# Patient Record
Sex: FEMALE | Race: WHITE | NOT HISPANIC OR LATINO | Employment: FULL TIME | URBAN - METROPOLITAN AREA
[De-identification: names, ages, dates, MRNs, and addresses within clinical notes are randomized per-mention and may not be internally consistent; named-entity substitution may affect disease eponyms.]

---

## 2017-11-22 ENCOUNTER — LAB REQUISITION (OUTPATIENT)
Dept: LAB | Facility: HOSPITAL | Age: 42
End: 2017-11-22
Payer: COMMERCIAL

## 2017-11-22 ENCOUNTER — ALLSCRIPTS OFFICE VISIT (OUTPATIENT)
Dept: OTHER | Facility: OTHER | Age: 42
End: 2017-11-22

## 2017-11-22 DIAGNOSIS — Z01.419 ENCOUNTER FOR GYNECOLOGICAL EXAMINATION WITHOUT ABNORMAL FINDING: ICD-10-CM

## 2017-11-22 DIAGNOSIS — Z12.31 ENCOUNTER FOR SCREENING MAMMOGRAM FOR MALIGNANT NEOPLASM OF BREAST: ICD-10-CM

## 2017-11-22 PROCEDURE — 87624 HPV HI-RISK TYP POOLED RSLT: CPT | Performed by: OBSTETRICS & GYNECOLOGY

## 2017-11-22 PROCEDURE — G0145 SCR C/V CYTO,THINLAYER,RESCR: HCPCS | Performed by: OBSTETRICS & GYNECOLOGY

## 2017-11-23 NOTE — PROGRESS NOTES
Assessment  1  Visit for routine gyn exam (V72 31) (Z01 419)  2  Encounter for screening mammogram for breast cancer (V76 12) (Z12 31)    Plan  Encounter for screening mammogram for breast cancer    · MAMMO SCREENING BILATERAL W 3D & CAD; Status:Hold For - Scheduling; Requestedfor:22Nov2017;   Perform: [facility]; [phone]; Ordered; For:Encounter for screening mammogram for breast cancer; Ordered By:Shivam Bar;  Visit for routine gyn exam    · Follow-up visit in 1 year Evaluation and Treatment  Follow-up  Status: Hold For -Scheduling  Requested for: [id number]  Ordered; For: Visit for routine gyn exam;  Ordered By: Valentina Ramirez  Performed:   Due: 85NVU1637    Discussion/Summary  Currently, she eats a healthy diet and has an adequate exercise regimen  the risks and benefits of cervical cancer screening were discussed HPV and Pap Co-testing Done Today Breast cancer screening: the risks and benefits of breast cancer screening were discussed, monthly self breast exam was advised and mammogram has been ordered  Advice and education were given regarding aerobic exercise, weight bearing exercise, calcium supplements, vitamin D supplements and contraception  Gynecologic examBCM- Mirena, replacement in the near futureSBE once a month CoTest '17 and '20Exercise 3 times a week- she does 4Calcium thousand milligrams with vitamin D- she does  The patient has the current Goals:1  Mirena replacement1  The patent has the current Barriers:1  None1    Patient is able to Self-Care1        1 Amended By: Valentina Ramirez; Dec 18 2017 7:01 PM EST    Chief Complaint  Pt due for yv states no problemsLast pap- 01/10/12 D-      History of Present Illness  HPI: Vishnu Pappas is here for an annual visit  She is without complaints  She feels great  Her Mirena is due for replacement  Review of Systems   Constitutional: recent 5 lb weight loss, but-- no fever,-- not feeling poorly,-- no chills-- and-- not feeling tired    ENT: no ear ache, no loss of hearing, no nosebleeds or nasal discharge, no sore throat or hoarseness  Cardiovascular: no complaints of slow or fast heart rate, no chest pain, no palpitations, no leg claudication or lower extremity edema  Respiratory: no complaints of shortness of breath, no wheezing, no dyspnea on exertion, no orthopnea or PND  Breasts: no complaints of breast pain, breast lump or nipple discharge  Gastrointestinal: no complaints of abdominal pain, no constipation, no nausea or diarrhea, no vomiting, no bloody stools  Genitourinary: Coitus is twice to 3 times a week without problems, but-- no complaints of dysuria, no incontinence, no pelvic pain, no dysmenorrhea, no vaginal discharge or abnormal vaginal bleeding  Musculoskeletal: no complaints of arthralgia, no myalgia, no joint swelling or stiffness, no limb pain or swelling  Integumentary: no complaints of skin rash or lesion, no itching or dry skin, no skin wounds  Neurological: no complaints of headache, no confusion, no numbness or tingling, no dizziness or fainting  Active Problems  1  Visit for routine gyn exam () (Z01 419)    Past Medical History   · 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)    The active problems and past medical history were reviewed and updated today     2 para 2: SAVD 10/08 41 2/7 F 9-4; 10/10 39 wks F 8-8 Myoma 6 cm by US UTI  Mirena  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 - CT with FOSTER 3/16      Surgical History   · History of Oral Surgery Tooth Extraction Gwinner Tooth    Family History  Mother    · No pertinent family history   MGM- Lung Ca d 64 MGF- CVA d 81 PGM- dysrhythmia PGF- d from MVA complications   Social History   · Acute alcohol use   · Currently sexually active   · Daily caffeine consumption   · Exercises 3 to 4 times per week (V49 89) (Z78 9)   · Never a smoker   · No drug use   · Two children   ,  '04  Petra 7, Montse 5   Current Meds  1  Daily Vitamin Oral Tablet; Therapy: (Recorded:42Glv1223) to Recorded  2  Mirena (52 MG) 20 MCG/24HR Intrauterine Intrauterine Device; Therapy: (Recorded:22Nov2017) to Recorded    Allergies  1  No Known Drug Allergies    Vitals   Recorded: 22Nov2017 02:14PM   Heart Rate 61   Systolic 844, RUE, Sitting   Diastolic 64, RUE, Sitting   Height 5 ft 7 in   Weight 157 lb 4 oz   BMI Calculated 24 63   BSA Calculated 1 83     Physical Exam   Constitutional  General appearance: No acute distress, well appearing and well nourished  Neck  Neck: Normal, supple, trachea midline, no masses  Thyroid: Normal, no thyromegaly  Pulmonary  Respiratory effort: No increased work of breathing or signs of respiratory distress  Auscultation of lungs: Clear to auscultation  Cardiovascular  Auscultation of heart: Normal rate and rhythm, normal S1 and S2, no murmurs  Peripheral vascular exam: Normal pulses Throughout  Genitourinary  External genitalia: Normal and no lesions appreciated  Vagina: Normal, no lesions or dryness appreciated  Urethra: Normal    Urethral meatus: Normal    Bladder: Normal, soft, non-tender and no prolapse or masses appreciated  Cervix: Normal, no palpable masses  -- Mild eversion, IUD string present  Uterus: Normal, non-tender, not enlarged, and no palpable masses  -- RV  Adnexa/parametria: Normal, non-tender and no fullness or masses appreciated  Anus, perineum, and rectum: Normal sphincter tone, no masses, and no prolapse  Chest  Breasts: Normal and no dimpling or skin changes noted  Abdomen  Abdomen: Normal, non-tender, and no organomegaly noted  Liver and spleen: No hepatomegaly or splenomegaly  Examination for hernias: No hernias appreciated  Lymphatic  Palpation of lymph nodes in neck, axillae, groin and/or other locations: No lymphadenopathy or masses noted     Skin  Skin and subcutaneous tissue: Normal skin turgor and no rashes     Palpation of skin and subcutaneous tissue: Normal    Psychiatric  Orientation to person, place, and time: Normal    Mood and affect: Normal        Signatures   Electronically signed by : LES Zamorano ; Dec 19 2017  8:57AM EST                       (Author)

## 2017-11-29 LAB — HPV RRNA GENITAL QL NAA+PROBE: NORMAL

## 2017-11-30 ENCOUNTER — GENERIC CONVERSION - ENCOUNTER (OUTPATIENT)
Dept: OTHER | Facility: OTHER | Age: 42
End: 2017-11-30

## 2017-11-30 LAB
LAB AP GYN PRIMARY INTERPRETATION: NORMAL
Lab: NORMAL

## 2017-12-19 ENCOUNTER — GENERIC CONVERSION - ENCOUNTER (OUTPATIENT)
Dept: OTHER | Facility: OTHER | Age: 42
End: 2017-12-19

## 2018-01-11 NOTE — RESULT NOTES
Verified Results  (1) THIN PREP PAP WITH IMAGING 22Nov2017 02:57PM Franklin Saxena Order Number: [id number]     Test Name Result Flag Reference   LAB AP CASE REPORT (Report)     Gynecologic Cytology Report            Case: [id number]                  Authorizing Provider: Madeleine Del Cid MD     Collected:      11/22/2017 1457        First Screen:     VENITA Alford    Received:      11/28/2017 1153        Specimen:  LIQUID-BASED PAP, SCREENING, Cervix   HPV HIGH RISK RESULT (Report)     HPV, High Risk: HPV NEG, HPV16 NEG, HPV18 NEG      Other High Risk HPV Negative, HPV 16 Negative, HPV 18 Negative  HPV types: 16,18,31,33,35,39,45,51,52,56,58,59,66 and 68 DNA are undetectable or below the pre-set threshold  [employer] FDA approved Chayo 4800 is utilized with strict adherence to the manufacturers instruction  manual to test for the presence of High-Risk HPV DNA, as well as HPV 16 and HPV 18  This instrument  has been validated by our laboratory and/or by the   A negative result does not preclude the presence of HPV infection because results depend on adequate  specimen collection, absence of inhibitors and sufficient DNA to be detected  Additionally, HPV negative  results are not intended to prevent women from proceeding to colposcopy if clinically warranted  Positive HPV test results indicate the presence of any one or more of the high risk types, but since patients  are often co-infected with low-risk types it does not rule out the presence of low-risk types in patients  with mixed infections  LAB AP GYN PRIMARY INTERPRETATION      Negative for intraepithelial lesion or malignancy  Electronically signed by VENITA Alford on 11/30/2017 at 4:24 PM   LAB AP GYN SPECIMEN ADEQUACY      Satisfactory for evaluation  Endocervical/transformation zone component present     LAB AP GYN ADDITIONAL INFORMATION (Report)     "[employer]'s FDA approved ,  and ThinPrep Imaging System are utilized with strict adherence to the 's instruction manual to   prepare gynecologic and non-gynecologic cytology specimens for the   production of ThinPrep slides as well as for gynecologic ThinPrep imaging  These processes have been validated by our laboratory and/or by the     The Pap test is not a diagnostic procedure and should not be used as the   sole means to detect cervical cancer  It is only a screening procedure to   aid in the detection of cervical cancer and its precursors  Both   false-negative and false-positive results have been experienced  Your   patient's test result should be interpreted in this context together with   the history and clinical findings

## 2018-01-14 VITALS
SYSTOLIC BLOOD PRESSURE: 130 MMHG | WEIGHT: 157.25 LBS | HEIGHT: 67 IN | DIASTOLIC BLOOD PRESSURE: 64 MMHG | BODY MASS INDEX: 24.68 KG/M2 | HEART RATE: 61 BPM

## 2018-01-24 ENCOUNTER — OFFICE VISIT (OUTPATIENT)
Dept: GYNECOLOGY | Facility: CLINIC | Age: 43
End: 2018-01-24
Payer: COMMERCIAL

## 2018-01-24 VITALS
SYSTOLIC BLOOD PRESSURE: 108 MMHG | BODY MASS INDEX: 24.64 KG/M2 | HEIGHT: 67 IN | WEIGHT: 157 LBS | HEART RATE: 72 BPM | OXYGEN SATURATION: 99 % | DIASTOLIC BLOOD PRESSURE: 68 MMHG

## 2018-01-24 VITALS
BODY MASS INDEX: 25.27 KG/M2 | HEIGHT: 67 IN | SYSTOLIC BLOOD PRESSURE: 118 MMHG | DIASTOLIC BLOOD PRESSURE: 60 MMHG | WEIGHT: 161 LBS

## 2018-01-24 DIAGNOSIS — Z97.5 IUD (INTRAUTERINE DEVICE) IN PLACE: Primary | ICD-10-CM

## 2018-01-24 PROBLEM — D23.5: Status: ACTIVE | Noted: 2018-01-24

## 2018-01-24 PROCEDURE — 99212 OFFICE O/P EST SF 10 MIN: CPT | Performed by: OBSTETRICS & GYNECOLOGY

## 2018-01-24 RX ORDER — ADAPALENE 3 MG/G
GEL TOPICAL
Refills: 0 | COMMUNITY
Start: 2017-12-22

## 2018-01-24 NOTE — PROGRESS NOTES
Assessment/Plan:      Diagnoses and all orders for this visit:    IUD (intrauterine device) in place    Other orders  -     Adapalene 0 3 % gel; KOLBY TO SKIN ONCE D  -     Multiple Vitamin (DAILY-VITAMIN PO); Take by mouth  -     Levonorgestrel (MIRENA, 52 MG, IU); by Intrauterine route          Subjective:     Patient ID: Bobbi Tatum is a 38 y o  female  Donnice Goods is here for an IUD check  She has no complaints  This was a replacements and she did well with the 1st unit  She just began having her menses today  She had no cramping or bleeding since her last menses  There has been no interval health changes  Review of Systems : No fever, chills, or pain  No Dyspareunia     Objective:     Physical Exam   Constitutional: She appears well-developed and well-nourished  Abdominal: Soft  She exhibits no mass  There is no tenderness  Genitourinary: Vagina normal    Genitourinary Comments: Light bleeding coming from the cervical os  Two IUD strings visible, at least 2-1/2 cm in length

## 2025-01-23 ENCOUNTER — OFFICE VISIT (OUTPATIENT)
Dept: OBGYN CLINIC | Facility: CLINIC | Age: 50
End: 2025-01-23
Payer: COMMERCIAL

## 2025-01-23 VITALS
HEIGHT: 67 IN | DIASTOLIC BLOOD PRESSURE: 72 MMHG | BODY MASS INDEX: 27.94 KG/M2 | SYSTOLIC BLOOD PRESSURE: 108 MMHG | WEIGHT: 178 LBS

## 2025-01-23 DIAGNOSIS — N95.1 PERIMENOPAUSE: ICD-10-CM

## 2025-01-23 DIAGNOSIS — Z12.4 SCREENING FOR CERVICAL CANCER: ICD-10-CM

## 2025-01-23 DIAGNOSIS — Z01.419 ENCOUNTER FOR GYNECOLOGICAL EXAMINATION WITHOUT ABNORMAL FINDING: Primary | ICD-10-CM

## 2025-01-23 PROCEDURE — G0145 SCR C/V CYTO,THINLAYER,RESCR: HCPCS | Performed by: OBSTETRICS & GYNECOLOGY

## 2025-01-23 PROCEDURE — 99214 OFFICE O/P EST MOD 30 MIN: CPT | Performed by: OBSTETRICS & GYNECOLOGY

## 2025-01-23 PROCEDURE — G0476 HPV COMBO ASSAY CA SCREEN: HCPCS | Performed by: OBSTETRICS & GYNECOLOGY

## 2025-01-23 PROCEDURE — S0610 ANNUAL GYNECOLOGICAL EXAMINA: HCPCS | Performed by: OBSTETRICS & GYNECOLOGY

## 2025-01-23 RX ORDER — VENLAFAXINE HYDROCHLORIDE 37.5 MG/1
37.5 CAPSULE, EXTENDED RELEASE ORAL DAILY
Qty: 90 CAPSULE | Refills: 3 | Status: SHIPPED | OUTPATIENT
Start: 2025-01-23

## 2025-01-23 NOTE — PROGRESS NOTES
Name: Cecy Craig      : 1975      MRN: [id number]  Encounter Provider: Liset Egan MD  Encounter Date: 2025   Encounter department: [facility] [place]  :  Assessment & Plan  Encounter for gynecological examination without abnormal finding  Pap smear obtained  STD testing declined  She has not had a mammogram yet at age 49.  We reviewed the lifetime risk of breast cancer and I strongly encouraged her to mammogram done.  Cologuard up-to-date  Orders:  •  Mammo screening bilateral w 3d and cad; Future    Perimenopause  She has perimenopausal symptoms.  She did start estroven over-the-counter supplementation and felt like her hot flashes were better.  However she does continue to have irritability and mood swings significantly.  We discussed hormones in the perimenopausal transition.  She does need contraception and we recommended continuing until 1 year after menopause  We also discussed option to remove the IUD and reinsert a new one, remove IUD and trial OCPs to help with the perimenopausal transition, replace IUD and use estrogen patch, start antidepressant.  Her PCP has also discussed an antidepressant and she would like  start this. We reviewed side effects. Plan to f/u in 2 months and she will continue to consider birth control options.  Orders:  •  venlafaxine (EFFEXOR-XR) 37.5 mg 24 hr capsule; Take 1 capsule (37.5 mg total) by mouth daily        History of Present Illness   HPI  Cecy Craig is a 49 y.o. female who presents for an annual visit  Last Pap Smear- 2017 NILMHPV-  LMP- unknown due to Mirena  Birth control-Mirena, inserted 2017  Mammogram- has never had  Colonoscopy- Cologuard 24 Neg; 3 yr recall    Currently sexually active  Declines Std testing    No family history of uterine, ovarian, cervical or breast cancer  Patient has concerns on hormones and other birth control options. Mother had early menopause and very  "symptomatic. She has irritability, mood swings, weight issues.         Review of Systems       Objective   /72 (BP Location: Right arm, Patient Position: Sitting, Cuff Size: Standard)   Ht 5' 7\" (1.702 m)   Wt 80.7 kg (178 lb)   LMP  (LMP Unknown)   BMI 27.88 kg/m²      Physical Exam  Vitals and nursing note reviewed.   Constitutional:       General: She is not in acute distress.     Appearance: She is not ill-appearing.   Pulmonary:      Effort: Pulmonary effort is normal. No respiratory distress.   Chest:   Breasts:     Right: Normal. No mass, nipple discharge or skin change.      Left: Normal. No mass, nipple discharge or skin change.   Abdominal:      General: There is no distension.      Palpations: Abdomen is soft.      Tenderness: There is no abdominal tenderness. There is no guarding or rebound.   Genitourinary:     General: Normal vulva.      Exam position: Lithotomy position.      Vagina: Normal.      Cervix: Normal. No cervical motion tenderness.      Uterus: Normal. Not enlarged and not tender.       Adnexa:         Right: No mass, tenderness or fullness.          Left: No mass, tenderness or fullness.     Lymphadenopathy:      Upper Body:      Right upper body: No axillary adenopathy.      Left upper body: No axillary adenopathy.   Neurological:      Mental Status: She is alert.           "

## 2025-01-24 LAB
HPV HR 12 DNA CVX QL NAA+PROBE: NEGATIVE
HPV16 DNA CVX QL NAA+PROBE: NEGATIVE
HPV18 DNA CVX QL NAA+PROBE: NEGATIVE

## 2025-01-27 ENCOUNTER — RESULTS FOLLOW-UP (OUTPATIENT)
Dept: OTHER | Facility: HOSPITAL | Age: 50
End: 2025-01-27

## 2025-01-28 ENCOUNTER — TELEPHONE (OUTPATIENT)
Age: 50
End: 2025-01-28

## 2025-01-28 NOTE — TELEPHONE ENCOUNTER
Patient states she saw Dr. Egan 1/23/25 and has taken Effexor x 5 days, she doses in the evening after dinner but med makes her head feel very heavy and sleepy. Feels groggy in AM as well.   Patient asking if alternative could be prescribed without this side effect?  [facility] pharmacy preferred.   HP sent to provider for review.

## 2025-01-28 NOTE — TELEPHONE ENCOUNTER
Called patient and reviewed providers response/recommendations. CTS unable to schedule virtual visit. Will route to clerical to help coordinate

## 2025-01-29 LAB
LAB AP GYN PRIMARY INTERPRETATION: NORMAL
Lab: NORMAL

## 2025-02-05 ENCOUNTER — TELEMEDICINE (OUTPATIENT)
Dept: OBGYN CLINIC | Facility: CLINIC | Age: 50
End: 2025-02-05
Payer: COMMERCIAL

## 2025-02-05 DIAGNOSIS — N95.1 PERIMENOPAUSE: Primary | ICD-10-CM

## 2025-02-05 PROCEDURE — 99213 OFFICE O/P EST LOW 20 MIN: CPT | Performed by: OBSTETRICS & GYNECOLOGY

## 2025-02-05 RX ORDER — ESTRADIOL 0.05 MG/D
1 PATCH TRANSDERMAL WEEKLY
Qty: 4 PATCH | Refills: 1 | Status: SHIPPED | OUTPATIENT
Start: 2025-02-05

## 2025-02-05 NOTE — PROGRESS NOTES
Virtual Brief Visit  Name: Cecy Craig      : 1975      MRN: [id number]  Encounter Provider: Liset Egan MD  Encounter Date: 2025   Encounter department: [facility] [place]    This Visit is being completed by telephone. The Patient is located at Home and in the following state in which I hold an active license [place]    The patient was identified by name and date of birth. Cecy Craig was informed that this is a telemedicine visit and that the visit is being conducted through the [employer] Teams platform. She agrees to proceed..  My office door was closed. No one else was in the room.  She acknowledged consent and understanding of privacy and security of the video platform. The patient has agreed to participate and understands they can discontinue the visit at any time.    Patient is aware this is a billable service.     :  Assessment & Plan  Perimenopause  We reviewed her side effects of venlafaxine.  She does not want to try that medication any longer.  She has already stopped it.  We discussed alternatives including trying a different antidepressant versus keeping her IUD and trying HRT versus waiting IUD and trying OCPs.  We discussed the difference between HRT and OCPs.  We discussed the dosing differences and side effects.  She would like to keep the IUD that she currently has and try low-dose patch.  If she were to want to continue the patch then I would recommend switching out an IUD for comfortable endometrial protection given that she IUD.  She has an appt scheduled in a few weeks  Orders:  •  estradiol (Climara) 0.05 mg/24 hr; Place 1 patch on the skin over 7 days once a week    Perimenopause               History of Present Illness   HPI  Has virtual visit to discuss an alternative to Effexor without side effects. Patient has taken Effexor for 5 days and states she does not like it, makes her head feel very heavy and sleepy. And feeling  groggy in AM    Non-smoker  Denies hx of migraines with aura.  Denies personal or FH of VTE.      Visit Time  Total Visit Duration: 15 minutes with documentation and phone call (12 min)

## 2025-04-28 DIAGNOSIS — N95.1 PERIMENOPAUSE: ICD-10-CM

## 2025-04-28 RX ORDER — ESTRADIOL 0.05 MG/D
1 PATCH TRANSDERMAL WEEKLY
Qty: 4 PATCH | Refills: 1 | Status: SHIPPED | OUTPATIENT
Start: 2025-04-28

## 2025-04-28 NOTE — TELEPHONE ENCOUNTER
Medication: estradiol (Climara) 0.05 mg/24 hr     Dose/Frequency:  Place 1 patch on the skin over 7 days once a week     Quantity: 4 patch    Pharmacy: [facility] [id number] [place]    Office:   [] PCP/Provider -   [x] Speciality/Provider - Liset Egan MD     Does the patient have enough for 3 days?   [] Yes   [x] No - Send as HP to POD       Patient had called she had wanted to report that the Estradiol patch had been working for her. She has now been off of the patch for 2 weeks because she had run out and is noticing the symptoms returning. Patient said that she had noticed a difference with hot flashes and her mood while on the patch. Please advise if this could be refilled for patient any questions patient can be reached at [phone].

## 2025-06-20 DIAGNOSIS — N95.1 PERIMENOPAUSE: ICD-10-CM

## 2025-06-20 RX ORDER — ESTRADIOL 0.05 MG/D
1 PATCH TRANSDERMAL WEEKLY
Qty: 4 PATCH | Refills: 5 | Status: SHIPPED | OUTPATIENT
Start: 2025-06-20